# Patient Record
Sex: MALE | Race: WHITE
[De-identification: names, ages, dates, MRNs, and addresses within clinical notes are randomized per-mention and may not be internally consistent; named-entity substitution may affect disease eponyms.]

---

## 2017-09-13 VITALS
DIASTOLIC BLOOD PRESSURE: 75 MMHG | SYSTOLIC BLOOD PRESSURE: 116 MMHG | DIASTOLIC BLOOD PRESSURE: 75 MMHG | SYSTOLIC BLOOD PRESSURE: 116 MMHG | SYSTOLIC BLOOD PRESSURE: 116 MMHG | DIASTOLIC BLOOD PRESSURE: 75 MMHG | DIASTOLIC BLOOD PRESSURE: 75 MMHG | SYSTOLIC BLOOD PRESSURE: 116 MMHG

## 2019-06-04 ENCOUNTER — HOSPITAL ENCOUNTER (OUTPATIENT)
Dept: HOSPITAL 61 - MRI | Age: 84
Discharge: HOME | End: 2019-06-04
Attending: FAMILY MEDICINE
Payer: MEDICARE

## 2019-06-04 DIAGNOSIS — R22.32: Primary | ICD-10-CM

## 2019-06-04 LAB
BUN SERPL-MCNC: 18 MG/DL (ref 8–26)
CREAT SERPL-MCNC: 0.8 MG/DL (ref 0.7–1.3)
GFR SERPLBLD BASED ON 1.73 SQ M-ARVRAT: 92.3 ML/MIN

## 2019-06-04 PROCEDURE — 82565 ASSAY OF CREATININE: CPT

## 2019-06-04 PROCEDURE — 36415 COLL VENOUS BLD VENIPUNCTURE: CPT

## 2019-06-04 PROCEDURE — 84520 ASSAY OF UREA NITROGEN: CPT

## 2019-06-06 ENCOUNTER — HOSPITAL ENCOUNTER (OUTPATIENT)
Dept: HOSPITAL 61 - MRI | Age: 84
Discharge: HOME | End: 2019-06-06
Attending: FAMILY MEDICINE
Payer: MEDICARE

## 2019-06-06 DIAGNOSIS — I10: ICD-10-CM

## 2019-06-06 DIAGNOSIS — M25.811: ICD-10-CM

## 2019-06-06 DIAGNOSIS — Z79.01: ICD-10-CM

## 2019-06-06 DIAGNOSIS — M62.511: ICD-10-CM

## 2019-06-06 DIAGNOSIS — M25.411: ICD-10-CM
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
## 2019-06-06 DIAGNOSIS — M19.011: ICD-10-CM

## 2019-06-06 DIAGNOSIS — Z88.8: ICD-10-CM

## 2019-06-06 DIAGNOSIS — M25.711: ICD-10-CM
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
## 2019-06-06 DIAGNOSIS — S46.011A: Primary | ICD-10-CM
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
## 2019-06-06 PROCEDURE — 73223 MRI JOINT UPR EXTR W/O&W/DYE: CPT

## 2019-06-06 NOTE — RAD
MR of the right shoulder with and without contrast

 

HISTORY: Mass above the right acromioclavicular joint.

 

TECHNIQUE: Routine pre and postcontrast images are obtained.

 

FINDINGS:

The acromioclavicular joint is degenerative with associated cysts and 

spurring. Soft tissue lesion above the AC joint consistent fluid signal 

with an enhancing wall and a multi lobulated morphology. This measures 3.4

cm x 2.0 cm x 2.0 cm. There is mild surrounding soft tissue edema. The 

appearance is compatible with a synovial cyst.

 

Complete rupture of supraspinatus and infraspinatus tendons with severe 

retraction to the superior glenoid, about 4.5 cm. High-grade or complete 

subscapularis tendon tear. Severe muscle atrophy with fatty infiltration. 

Small subdeltoid bursal effusion.

 

Severe glenohumeral joint primary osteoarthritis with severe cartilage 

loss and bone loss with osteophytes.

 

Circumferential labral degeneration and degenerative tearing. Biceps 

tendon poorly seen likely torn.

 

Intraosseous cysts at the humeral head. No aggressive bone destruction. No

evidence of acute fracture.

 

Mild generalized soft tissue and intramuscular edema about the shoulder.

 

IMPRESSION:

1. Massive rotator cuff tear with severe retraction and atrophy.

2. No severe primary osteoarthritis.

3. Circumferential labral degeneration and tearing.

4. Biceps tendon poorly seen compatible with tear.

5. Cystic lesion above the acromioclavicular joint most compatible with a 

synovial cyst.

 

Electronically signed by: Maurilio Almanza MD (6/6/2019 2:32 PM) [id number]

## 2019-08-05 ENCOUNTER — HOSPITAL ENCOUNTER (OUTPATIENT)
Dept: HOSPITAL 61 - RAD | Age: 84
Discharge: HOME | End: 2019-08-05
Attending: FAMILY MEDICINE
Payer: MEDICARE

## 2019-08-05 DIAGNOSIS — M16.11: Primary | ICD-10-CM

## 2019-08-05 PROCEDURE — 73502 X-RAY EXAM HIP UNI 2-3 VIEWS: CPT

## 2019-08-05 NOTE — RAD
EXAM: Right hip, 2 views.

 

HISTORY: Pain.

 

COMPARISON: None.

 

FINDINGS: 2 views of the right hip are obtained. There is mild marginal 

femoral head spurring. There is no fracture, dislocation or subacute 

fixation. There are right inguinal clips likely due to catheterization. 

There is partial visualization of an aortobiiliac endograft.

 

IMPRESSION: Mild right hip osteoarthritis.

 

Electronically signed by: Alivia Powell MD (8/5/2019 3:30 PM) [place] [id number]

## 2019-08-19 ENCOUNTER — HOSPITAL ENCOUNTER (OUTPATIENT)
Dept: HOSPITAL 61 - RAD | Age: 84
Discharge: HOME | End: 2019-08-19
Attending: FAMILY MEDICINE
Payer: MEDICARE

## 2019-08-19 DIAGNOSIS — M25.551: Primary | ICD-10-CM

## 2019-08-19 PROCEDURE — 77002 NEEDLE LOCALIZATION BY XRAY: CPT

## 2019-08-19 PROCEDURE — 20610 DRAIN/INJ JOINT/BURSA W/O US: CPT

## 2019-08-19 PROCEDURE — 20605 DRAIN/INJ JOINT/BURSA W/O US: CPT

## 2019-08-19 NOTE — RAD
Indication: Right hip pain. Patient here for hip injection.

 

TECHNIQUE: After explaining risks and benefits informed consent was 

obtained. Appropriate entrance site was chosen over the right hip joint. 

The skin was prepped and draped using usual sterile procedure. 1 percent 

lidocaine was given effusion. Under fluoroscopy guidance 22-gauge needle 

was introduced in the joint space. The placement was confirmed with 

injection of iodinated contrast. 4 mL of bupivacaine and 1 mL of 40 mg 

Depo-Medrol is injected in the joint space. The needle was drawn and then 

it was placed.

 

COMPARISON: None

 

FINDINGS/

impression: Right hip joint steroid injection without immediate 

complication.

 

Electronically signed by: Andrei Chinchilla DO (8/19/2019 2:04 PM) [facility]